# Patient Record
Sex: FEMALE | Race: WHITE
[De-identification: names, ages, dates, MRNs, and addresses within clinical notes are randomized per-mention and may not be internally consistent; named-entity substitution may affect disease eponyms.]

---

## 2019-08-24 ENCOUNTER — HOSPITAL ENCOUNTER (EMERGENCY)
Dept: HOSPITAL 35 - ER | Age: 24
Discharge: HOME | End: 2019-08-24
Payer: COMMERCIAL

## 2019-08-24 VITALS — SYSTOLIC BLOOD PRESSURE: 128 MMHG | DIASTOLIC BLOOD PRESSURE: 67 MMHG

## 2019-08-24 VITALS — WEIGHT: 293 LBS | BODY MASS INDEX: 47.09 KG/M2 | HEIGHT: 66 IN

## 2019-08-24 DIAGNOSIS — I10: ICD-10-CM

## 2019-08-24 DIAGNOSIS — E78.00: ICD-10-CM

## 2019-08-24 DIAGNOSIS — Z88.2: ICD-10-CM

## 2019-08-24 DIAGNOSIS — Z88.0: ICD-10-CM

## 2019-08-24 DIAGNOSIS — K21.9: Primary | ICD-10-CM

## 2019-08-24 DIAGNOSIS — E11.9: ICD-10-CM

## 2019-08-24 DIAGNOSIS — F41.9: ICD-10-CM

## 2019-08-24 LAB
ALBUMIN SERPL-MCNC: 3.9 G/DL (ref 3.4–5)
ALT SERPL-CCNC: 52 U/L (ref 30–65)
ANION GAP SERPL CALC-SCNC: 13 MMOL/L (ref 7–16)
AST SERPL-CCNC: 26 U/L (ref 15–37)
BASOPHILS NFR BLD AUTO: 0.9 % (ref 0–2)
BILIRUB DIRECT SERPL-MCNC: < 0.1 MG/DL
BILIRUB SERPL-MCNC: 0.3 MG/DL
BUN SERPL-MCNC: 14 MG/DL (ref 7–18)
CALCIUM SERPL-MCNC: 8.7 MG/DL (ref 8.5–10.1)
CHLORIDE SERPL-SCNC: 107 MMOL/L (ref 98–107)
CO2 SERPL-SCNC: 23 MMOL/L (ref 21–32)
CREAT SERPL-MCNC: 0.8 MG/DL (ref 0.6–1)
EOSINOPHIL NFR BLD: 2.7 % (ref 0–3)
ERYTHROCYTE [DISTWIDTH] IN BLOOD BY AUTOMATED COUNT: 13.4 % (ref 10.5–14.5)
GLUCOSE SERPL-MCNC: 141 MG/DL (ref 74–106)
GRANULOCYTES NFR BLD MANUAL: 54.8 % (ref 36–66)
HCT VFR BLD CALC: 38.1 % (ref 37–47)
HGB BLD-MCNC: 12.9 GM/DL (ref 12–15)
LIPASE: 222 U/L (ref 73–393)
LYMPHOCYTES NFR BLD AUTO: 32.3 % (ref 24–44)
MCH RBC QN AUTO: 27.1 PG (ref 26–34)
MCHC RBC AUTO-ENTMCNC: 33.8 G/DL (ref 28–37)
MCV RBC: 80.2 FL (ref 80–100)
MONOCYTES NFR BLD: 9.3 % (ref 1–8)
NEUTROPHILS # BLD: 6 THOU/UL (ref 1.4–8.2)
PLATELET # BLD: 305 THOU/UL (ref 150–400)
POTASSIUM SERPL-SCNC: 3.5 MMOL/L (ref 3.5–5.1)
PROT SERPL-MCNC: 7.2 G/DL (ref 6.4–8.2)
RBC # BLD AUTO: 4.75 MIL/UL (ref 4.2–5)
SODIUM SERPL-SCNC: 143 MMOL/L (ref 136–145)
TROPONIN I SERPL-MCNC: <0.06 NG/ML (ref ?–0.06)
WBC # BLD AUTO: 12.5 THOU/UL (ref 4–11)

## 2019-08-25 NOTE — EKG
[address]  [address]
Phone:  [phone]                    ELECTROCARDIOGRAM REPORT      
_______________________________________________________________________________
 
Name:       DASHAWN NOLEN                 Room #:                     DEP CARMEN MELARA#:      [id number]     Account #:      [id number]  
Admission:  19    Attend Phys:                          
Discharge:  19    Date of Birth:  [birth year]  
                                                          Report #: [id number]
   [id number]
_______________________________________________________________________________
THIS REPORT FOR:   //name//                          
 
                         [facility] ED
                                       
Test Date:    2019               Test Time:    01:27:18
Pat Name:     DASHAWN NOLEN            Department:   
Patient ID:   [id number]            Room:          
Gender:       F                        Technician:   daysi valderrama
:          1995               Requested By: Rasheeda Lane
Order Number: [id number] MD:   Hilario Rivas
                                 Measurements
Intervals                              Axis          
Rate:         94                       P:            44
MN:           136                      QRS:          44
QRSD:         86                       T:            3
QT:           335                                    
QTc:          419                                    
                           Interpretive Statements
Sinus rhythm
No previous ECG available for comparison
 
Electronically Signed On 2019 11:41:25 CDT by Hilario Rivas
[email or web address]
 
 
 
 
 
 
 
 
 
 
 
 
 
 
 
 
 
 
 
 
  <ELECTRONICALLY SIGNED>
   By: Hilario Rivas MD        
  19     1141
D: 19 0127                           _____________________________________
T: 19 0127                           Hilario Rivas MD          /AMANDA